# Patient Record
(demographics unavailable — no encounter records)

---

## 2024-12-16 NOTE — HISTORY OF PRESENT ILLNESS
[Gradual] : gradual [6] : 6 [5] : 5 [Dull/Aching] : dull/aching [Throbbing] : throbbing [Constant] : constant [Sleep] : sleep [Rest] : rest [Meds] : meds [Injection therapy] : injection therapy [Standing] : standing [Walking] : walking [Stairs] : stairs [de-identified] : pain in bilateral knees, right today greater than left.Has some swelling in both Pain with walking, stairs. Prior history of OA left knee, had Zilretta in May with help, Uses the meds on occasion [] : no [FreeTextEntry1] : BT knee  [FreeTextEntry5] : Patient has been treated for LT knee in the past and states it has persisted. RT knee pain began over the weekend. Swelling in both knees. LT knee worse.  [FreeTextEntry9] : meloxicam  [de-identified] : Dr. Domínguez

## 2024-12-16 NOTE — PHYSICAL EXAM
[Bilateral] : knee bilaterally [5___] : quadriceps 5[unfilled]/5 [Negative] : negative Braulio's [] : mildly antalgic [TWNoteComboBox7] : flexion 115 degrees

## 2024-12-16 NOTE — DISCUSSION/SUMMARY
[de-identified] : Patient allowed to gently start resuming activities. Discussed change to medication prescription and usage. Offered cortisone steroid injection.for pain control Bracing options discussed with patient. Hyaluronic Acid inj pamphlet given to pt. try topical lidocaine for pain reviewed current medications being used by this patient Home exercise for functional improvement

## 2024-12-16 NOTE — PROCEDURE
[Large Joint Injection] : Large joint injection [Right] : of the right [Knee] : knee [Inflammation] : inflammation [Betadine] : betadine [___ cc    1%] : Lidocaine ~Vcc of 1%  [Effusion] : effusion [] : Patient tolerated procedure well [Call if redness, pain or fever occur] : call if redness, pain or fever occur [Apply ice for 15min out of every hour for the next 12-24 hours as tolerated] : apply ice for 15 minutes out of every hour for the next 12-24 hours as tolerated [Patient was advised to rest the joint(s) for ____ days] : patient was advised to rest the joint(s) for [unfilled] days [All ultrasound images have been permanently captured and stored accordingly in our picture archiving and communication system] : All ultrasound images have been permanently captured and stored accordingly in our picture archiving and communication system [de-identified] : 40 [de-identified] : clear [FreeTextEntry3] : Large Joint Injection / Aspiration: Lidocaine, Zilretta Ultrasound and Guidance Lidocaine: 3 cc. Needle size: 18 gauge , 1.5 inch.  Zilretta: An Injection of Zilretta 32 Units 5ml ,  was injected into the bilateral  knees    Ultrasound Guidance was used for the following reasons: Cyst aspiration.   Diagnostic ultrasound was performed of the knee and is positive for effusion.  Ultrasound guided injection was performed of the knee, visualization of the needle and placement of injection was performed without complication.   Large Joint Injection was performed because of pain and inflammation. Anesthesia: ethyl chloride sprayed topically.. Patient has tried OTC's including aspirin, Ibuprofen, Aleve etc or prescription NSAIDS, and/or exercises at home and/ or physical therapy without satisfactory response. After verbal consent using sterile preparation and technique. The risks, benefits, and alternatives to aspiration were explained in full to the patient. Risks outlined include but are not limited to infection, sepsis, bleeding, scarring, skin discoloration, temporary increase in pain, syncopal episode, failure to resolve symptoms, allergic reaction and symptom recurrence. Patient understood the risks. All questions were answered. After discussion of options, patient requested an aspiration. Oral informed consent was obtained and sterile prep was done of the injection site. Sterile technique was utilized for the procedure including the preparation of the solutions used for the aspiration. Patient tolerated the procedure well. Advised to ice the injection site this evening. Prep with betadine locally to site. Sterile technique used. An aspiration was performed for the following reasons: effusion Amount aspirated: 90. Gross description: bloody Patient tolerated procedure well. Post Procedure Instructions: Patient was advised to call if redness, pain, or fever occur and apply ice for 15 min. out of every hour for the next 12-24 hours as tolerated. patient was advised to rest the joint(s) for 1 days.

## 2025-04-14 NOTE — PHYSICAL EXAM
[Normal Breath Sounds] : Normal breath sounds [Normal Rate and Rhythm] : normal rate and rhythm [2+] : left 2+ [Ankle Swelling (On Exam)] : present [Ankle Swelling Bilaterally] : bilaterally  [Varicose Veins Of Lower Extremities] : present [Varicose Veins Of The Right Leg] : of the right leg [Ankle Swelling On The Left] : moderate [No Rash or Lesion] : No rash or lesion [Calm] : calm [Alert] : alert [] : not present [Skin Ulcer] : no ulcer [de-identified] : Appears well, no acute distress noted [de-identified] : No palpable cords.  No calf tenderness. [de-identified] : Intact

## 2025-04-14 NOTE — ASSESSMENT
[FreeTextEntry1] : 87-year-old female with bilateral lower extremity edema.  Pedal pulses are intact bilaterally.  Based on clinical examination, there is no evidence of significant arterial deficiency at this time.  Duplex today demonstrates venous insufficiency in the right greater saphenous vein as well as the left smaller saphenous vein.  There is no evidence of deep vein thrombosis or superficial phlebitis in either of the lower extremities.  Recommend compression stockings, leg elevation, and exercise.  Patient to follow-up as needed.

## 2025-04-14 NOTE — HISTORY OF PRESENT ILLNESS
[FreeTextEntry1] : Patient is an 87-year-old female with history significant for cardiac ablation and hypertension as well as right lower extremity vein stripping who presents to the office today for evaluation.  Patient reports progressively worsening bilateral lower extremity edema for the past month that is more significant in the right lower extremity. Patient had a slightly elevated D-dimer.  Subsequent venous duplex was performed which showed no evidence of deep vein thrombosis in either of the lower extremities.  Denies fever or chills.  Denies chest pain or shortness of breath.  Denies rest pain or claudication symptoms.  Denies tissue loss or bleeding varicosities.  No history of deep vein thrombosis or pulmonary embolism.  No history of smoking.

## 2025-07-16 NOTE — HISTORY OF PRESENT ILLNESS
[Never] : never [TextBox_4] : TRUMAN TORRE is a 83 year old  F referred for pulmonary evaluation for abnormal CT  Notes some SOB.  Has noted over past month. Resolves few minutes.  [place] 1-2 flight. No significant cough. No wheezing. No CP.  No change in medical status since prior visit 2021 except now on antihypertensives.  No issue with swallowing or eating. No cough with eating.   Past pulmonary history. Pulmonary nodules Occupational Exposure. N Family history of pulmonary disease. Father COPD Recent travel N Pets N  Second hand tobacco exposure.  .

## 2025-07-16 NOTE — DISCUSSION/SUMMARY
[FreeTextEntry1] : Radiographic changes likely related to prior injury and mucoid impaction.  Consider low-grade chronic infectious etiology such as atypical mycobacterial disease.  Relatively asymptomatic with the exception of mild exertional dyspnea.  Mildly progressive.

## 2025-07-16 NOTE — PROCEDURE
[FreeTextEntry1] : 07/16/2025 Pulmonary function testing Normal flow rates with significant bronchodilator response. Moderate reduction in lung volume. Mild to moderate diffusion impairment. No significant change in flow rates compared to May 2021.  CT scan of the chest without contrast June 30, 2025 reviewed discussed compared to prior studies. Pulmonary nodules. Tree-in-bud opacities with some mild changing distribution. Right upper lobe tubular opacity without significant change. No significant adenopathy.   History:Lung nodules and shortness of breath   Comparison: CT scans of the chest dating back to 11-9-15. The most  recent CT study was performed on 3-22-19   Technique: Axial scans sections were obtained through the chest using  a multislice spiral CT scanner. Axial high-resolution and sagittal  and coronal reconstructions were obtained. Dose reduction software  was used to minimize patient radiation exposure. Radiation dose:  Total exam DLP: 365 mGy/cm.   Findings:   Lungs: Scans through the lung apices are degraded by a total right  shoulder replacement. There are clusters of small nodules mostly  calcified again seen posteriorly within the left lower lobe  unchanged. There is a branching tubular shaped density anteriorly  within the right middle lobe consistent with mucoid impaction within  a bronchiectatic bronchus. This is unchanged. There is a 2-3 mm  peripheral lung nodule posterolaterally within the right lower lobe  (series 3/image #116) which is stable. There is a focus of nodularity  medially along the right major fissure and a small calcified  pulmonary granuloma superiorly within the superior segment of the  right lower lobe unchanged. There is an area of fibrotic change  posteromedially within the right lower lobe adjacent to a right-sided  anterior osteophyte unchanged. No lung consolidations are seen. Mediastinum: There is a borderline enlarged right precarinal lymph  node measuring 1.1 cm x 0.9 cm and other smaller lymph nodes which  are stable.  Rachel: Negative (noncontrast) Cardiac: Atherosclerotic calcification within the thoracic aorta and  coronary arteries. No evidence of a thoracic aortic aneurysm. Pleura: Negative Bony thorax: Degenerative changes within the thoracic spine. Other: Status post cholecystectomy. Small hiatal hernia.    Impression:   Stable lung nodules.   Mucoid impaction within a few bronchiectatic bronchi medially within  the right middle lobe unchanged.   Stable borderline enlarged right precarinal lymph node and other  smaller lymph nodes.   Other findings as noted above unchanged.   Electronically signed by Cesar Brown MD 4/8/2021 11:23 AM  Ref. Physician: ZACH TABARES  [address] [place] [id number]

## 2025-07-16 NOTE — ASSESSMENT
[FreeTextEntry1] : Program of exercise and weight loss as tolerated. Follow-up in 1 year or sooner on a as needed basis. I have held off on bronchodilator therapy or other intervention at this time.  Consider trial any progression of shortness of breath. All discussed at length with patient and daughter. Advised to follow-up sooner should there be any progression of respiratory symptoms.

## 2025-07-16 NOTE — PHYSICAL EXAM
[Supple] : supple [No JVD] : no jvd [Normal S1, S2] : normal s1, s2 [No Murmurs] : no murmurs [Clear to Auscultation Bilaterally] : clear to auscultation bilaterally [Normal to Percussion] : normal to percussion [No Abnormalities] : no abnormalities [Benign] : benign [Not Tender] : not tender [No HSM] : no hsm [1+ Pitting] : 1+ pitting [TextBox_2] : Overweight

## 2025-07-16 NOTE — CONSULT LETTER
[Dear  ___] : Dear ~YANY, [Consult Letter:] : I had the pleasure of evaluating your patient, [unfilled]. [Consult Closing:] : Thank you very much for allowing me to participate in the care of this patient.  If you have any questions, please do not hesitate to contact me. [Sincerely,] : Sincerely, [FreeTextEntry2] : Donovan Hancock MD\par   [FreeTextEntry3] : Juan Ortiz MD FCCP\par